# Patient Record
Sex: FEMALE | Employment: FULL TIME | URBAN - METROPOLITAN AREA
[De-identification: names, ages, dates, MRNs, and addresses within clinical notes are randomized per-mention and may not be internally consistent; named-entity substitution may affect disease eponyms.]

---

## 2019-06-29 ENCOUNTER — HOSPITAL ENCOUNTER (EMERGENCY)
Facility: CLINIC | Age: 49
Discharge: HOME | End: 2019-06-29
Attending: NURSE PRACTITIONER | Admitting: NURSE PRACTITIONER

## 2019-06-29 ENCOUNTER — APPOINTMENT (OUTPATIENT)
Dept: GENERAL RADIOLOGY | Facility: CLINIC | Age: 49
End: 2019-06-29
Attending: NURSE PRACTITIONER

## 2019-06-29 VITALS
RESPIRATION RATE: 16 BRPM | TEMPERATURE: 98.5 F | SYSTOLIC BLOOD PRESSURE: 144 MMHG | OXYGEN SATURATION: 98 % | WEIGHT: 200 LBS | HEIGHT: 68 IN | DIASTOLIC BLOOD PRESSURE: 104 MMHG | BODY MASS INDEX: 30.31 KG/M2

## 2019-06-29 DIAGNOSIS — M54.42 BILATERAL LOW BACK PAIN WITH LEFT-SIDED SCIATICA: ICD-10-CM
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
## 2019-06-29 PROCEDURE — 72100 X-RAY EXAM L-S SPINE 2/3 VWS: CPT

## 2019-06-29 PROCEDURE — 99283 EMERGENCY DEPT VISIT LOW MDM: CPT

## 2019-06-29 RX ORDER — IBUPROFEN 200 MG
400 TABLET ORAL EVERY 8 HOURS PRN
Qty: 60 TABLET | Refills: 0 | Status: SHIPPED | OUTPATIENT
Start: 2019-06-29

## 2019-06-29 RX ORDER — ACETAMINOPHEN 500 MG
1000 TABLET ORAL EVERY 8 HOURS PRN
Qty: 60 TABLET | Refills: 0 | Status: SHIPPED | OUTPATIENT
Start: 2019-06-29 | End: 2019-07-06

## 2019-06-29 RX ORDER — CYCLOBENZAPRINE HCL 10 MG
10 TABLET ORAL 3 TIMES DAILY PRN
Qty: 20 TABLET | Refills: 0 | Status: SHIPPED | OUTPATIENT
Start: 2019-06-29 | End: 2019-07-05

## 2019-06-29 ASSESSMENT — ENCOUNTER SYMPTOMS
FEVER: 0
NECK PAIN: 1
BACK PAIN: 1
MYALGIAS: 1
HEADACHES: 0

## 2019-06-29 ASSESSMENT — MIFFLIN-ST. JEOR: SCORE: 1587.44

## 2019-06-29 NOTE — LETTER
June 29, 2019      To Whom It May Concern:      Deirdre Wright was seen in our Emergency Department today, 06/29/19.  Please excuse Deirdre from work on 6/29, 6/30, and 7/1 due to illness. She may return to work when improved.    Sincerely,        Iris Hand, CNP

## 2019-06-29 NOTE — ED PROVIDER NOTES
"  History     Chief Complaint:  Back pain     HPI   History provided by patient via phone interpretor translated from Burkinan.   Deirdre Wright is a 48 year old female who presents with low back pain after a mechanical fall yesterday. The patient was at work when she fell backwards onto the concrete floor. She did not hit her head, but landed onto her back. She did not lose consciousness. She was sent home from work after the accident, though her back pain was somewhat manageable with ibuprofen. She presents to the ED today as her lower back pain is worse today. She endorses shooting radiation up the back and shoulders and down the left leg. She is weight-bearing on this left leg, but this feels more painful when walking. She denies bladder or bowel incontinence, saddle numbness, weakness, or fever. She took naproxen today with improvement.     Allergies:  No known drug allergies     Medications:    The patient is not currently taking any prescribed medications.     Past Medical History:    History reviewed. The patient does not have any past pertinent medical history.    Past Surgical History:    History reviewed. No pertinent surgical history.     Family History:    History reviewed. No pertinent family history.      Social History:  Patient presents with male .   Patient works at [employer] in [place] where her injury occurred.     Review of Systems   Constitutional: Negative for fever.   Musculoskeletal: Positive for back pain, myalgias and neck pain.   Neurological: Negative for headaches.   All other systems reviewed and are negative.    Physical Exam     Patient Vitals for the past 24 hrs:   BP Temp Temp src Heart Rate Resp SpO2 Height Weight   06/29/19 1545 (!) 144/104 98.5  F (36.9  C) Oral 69 16 98 % 1.73 m (5' 8.11\") 90.7 kg (200 lb)      Physical Exam  Nursing notes reviewed. Vitals reviewed.  General: Alert. Well kept.  Eyes:  Conjunctiva non-injected, non-icteric.  Neck/Throat: Moist " mucous membranes. Normal voice.  Cardiac: Regular rhythm. Normal heart sounds.  Pulmonary: Clear and equal breath sounds bilaterally.   Musculoskeletal:  No midline tenderness to the spine.  Pain over the left paraspinal muscles and left SI joint without rash.  Normal movement at the hips/knee/ankles.   Skin:  Warm and dry without rashes.  Neuro:  Normal sensation throughout the legs.  5/5 strength at the hips/knees/ankles.  2+ patellar reflexes.  Normal gait, able to transfer from laying to sitting to standing without assistance.  Psych:  Normal affect.    Emergency Department Course     Imaging:  Radiographic findings were communicated with the patient who voiced understanding of the findings.     X-ray Lumbar Spine, 2-3 views:  No evidence for fracture or any posterior malalignment.  Very minimal anterior spurs are seen at L2-3 and L3-4.  Result per radiology.      Emergency Department Course:  Past medical records, nursing notes, and vitals reviewed.  1556: I performed an exam of the patient and obtained history, as documented above.     The patient was sent for a lumbar spine x-ray  while in the emergency department, findings above.      1702: I rechecked the patient. Findings and plan explained to the Patient via phone interpretor. Patient discharged home with instructions regarding supportive care, medications, and reasons to return. The importance of close follow-up was reviewed.      Impression & Plan      Medical Decision Making:  Deirdre Wright is a 48 year old female who presents for evaluation of low back pain and radicular symptoms after mechanical fall while at work. They have no history of back pain in the past. She did not require interventions in the ED. The patient's pain is after a fall, so x-ray of the lumbar spine was obtained which was negative for acute fracture of malalignment. Advanced imaging with CT/MRI is not indicated at this time, but may be indicated in the future if symptoms fail to  resolve. Nor is there any indication for consultation with neurosurgery or orthopedic spinal surgeon. The patient has not had a fever, saddle/perineal anesthesia, bilateral foot numbness, or bowel or bladder dysfunction. There is no clinical evidence of cauda equina syndrome, discitis, spinal/epidural space hematoma or epidural abscess. The neurological exam is normal, with the exception of the dermatomal symptoms noted herein. The patient was advised that radiculopathy often takes significant time to resolve, and that follow up with primary care, neurology and/or neurosurgery will be indicated if symptoms do not improve. The patient will be discharged with pain medications and muscle relaxer to use as directed and she was given a work note for 3 days. No heavy lifting, bending or twisting. Return if increasing pain, muscular weakness, or bowel or bladder dysfunction.   Diagnosis:    ICD-10-CM    1. Fall, initial encounter W19.XXXA    2. Bilateral low back pain with left-sided sciatica M54.42      Disposition:  Discharged.     Discharge Medications:  acetaminophen 500 MG tablet  Commonly known as:  TYLENOL  1,000 mg, Oral, EVERY 8 HOURS PRN     cyclobenzaprine 10 MG tablet  Commonly known as:  FLEXERIL  10 mg, Oral, 3 TIMES DAILY PRN     ibuprofen 200 MG tablet  Commonly known as:  ADVIL/MOTRIN  400 mg, Oral, EVERY 8 HOURS PRN       Sony Chou  6/29/2019    EMERGENCY DEPARTMENT    Scribe Disclosure:  I, Sony Chou, am serving as a scribe at 3:56 PM on 6/29/2019 to document services personally performed by Iris Hand DNP based on my observations and the provider's statements to me.        Iris Hand, JACQUES  06/30/19 0028

## 2019-06-29 NOTE — ED AVS SNAPSHOT
Emergency Department  [address] [facility] [address]  Phone:  [phone]  Fax:  [phone]                                    Deirdre Wright   MRN: [id number]    Department:   Emergency Department   Date of Visit:  6/29/2019           After Visit Summary Signature Page    I have received my discharge instructions, and my questions have been answered. I have discussed any challenges I see with this plan with the nurse or doctor.    ..........................................................................................................................................  Patient/Patient Representative Signature      ..........................................................................................................................................  Patient Representative Print Name and Relationship to Patient    ..................................................               ................................................  Date                                   Time    ..........................................................................................................................................  Reviewed by Signature/Title    ...................................................              ..............................................  Date                                               Time          22EPIC Rev 08/18

## 2019-06-29 NOTE — ED NOTES
Patient reports she was at work yesterday when she had a mechanical fall onto a concrete surface. Solange LOC or hitting head. Patient reports lower back pain that radiates up to neck and also right leg pain. Pt able to walk in NAD. Pt took Ibuprofen yesterday after injury and took naproxen today. Pt reports her pain is much better after taking naproxen. Family at bedside.  used.

## 2019-06-29 NOTE — DISCHARGE INSTRUCTIONS
Discharge Instructions  Back Pain  You were seen today for back pain. Back pain can have many causes, but most will get better without surgery or other specific treatment. Sometimes there is a herniated ( slipped ) disc. We do not usually do MRI scans to look for these right away, since most herniated discs will get better on their own with time.  Today, we did not find any evidence that your back pain was caused by a serious condition. However, sometimes symptoms develop over time and cannot be found during an emergency visit, so it is very important that you follow up with your primary provider.  Generally, every Emergency Department visit should have a follow-up clinic visit with either a primary or a specialty clinic/provider. Please follow-up as instructed by your emergency provider today.    Return to the Emergency Department if:  You develop a fever with your back pain.   You have weakness or change in sensation in one or both legs.  You lose control of your bowels or bladder, or cannot empty your bladder (cannot pee).  Your pain gets much worse.     Follow-up with your provider:  Unless your pain has completely gone away, please make an appointment with your provider within one week. Most of the routine care for back pain is available in a clinic and not the Emergency Department. You may need further management of your back pain, such as more pain medication, imaging such as an X-ray or MRI, or physical therapy.    What can I do to help myself?  Remain Active -- People are often afraid that they will hurt their back further or delay recovery by remaining active, but this is one of the best things you can do for your back. In fact, staying in bed for a long time to rest is not recommended. Studies have shown that people with low back pain recover faster when they remain active. Movement helps to bring blood flow to the muscles and relieve muscle spasms as well as preventing loss of muscle strength.  Heat --  Using a heating pad can help with low back pain during the first few weeks. Do not sleep with a heating pad, as you can be burned.   Pain medications - You may take a pain medication such as Tylenol  (acetaminophen), Advil , Motrin  (ibuprofen) or Aleve  (naproxen).  If you were given a prescription for medicine here today, be sure to read all of the information (including the package insert) that comes with your prescription.  This will include important information about the medicine, its side effects, and any warnings that you need to know about.  The pharmacist who fills the prescription can provide more information and answer questions you may have about the medicine.  If you have questions or concerns that the pharmacist cannot address, please call or return to the Emergency Department.   Remember that you can always come back to the Emergency Department if you are not able to see your regular provider in the amount of time listed above, if you get any new symptoms, or if there is anything that worries you.